# Patient Record
(demographics unavailable — no encounter records)

---

## 2024-10-10 NOTE — PHYSICAL EXAM
[Chaperone Present] : A chaperone was present in the examining room during all aspects of the physical examination [[id number]] : A chaperone was present during the pelvic exam. [FreeTextEntry2] :  MADELINE Alonzo  [Appropriately responsive] : appropriately responsive [Alert] : alert [No Acute Distress] : no acute distress [No Lymphadenopathy] : no lymphadenopathy [Soft] : soft [Non-tender] : non-tender [Non-distended] : non-distended [No HSM] : No HSM [No Lesions] : no lesions [No Mass] : no mass [Oriented x3] : oriented x3 [Labia Majora] : normal [Labia Minora] : normal [Atrophy] : atrophy [Dry Mucosa] : dry mucosa [Normal] : normal [Tenderness] : nontender [Enlarged ___ wks] : not enlarged [Uterine Adnexae] : normal [FreeTextEntry1] : left lower vulva notable for slightly raised dry lesion with excoriations and small vulvar fissures noted

## 2024-10-10 NOTE — HISTORY OF PRESENT ILLNESS
[Patient reported mammogram was normal] : Patient reported mammogram was normal [Patient reported breast sonogram was normal] : Patient reported breast sonogram was normal [Patient reported PAP Smear was normal] : Patient reported PAP Smear was normal [Patient reported colonoscopy was normal] : Patient reported colonoscopy was normal [postmenopausal] : postmenopausal [N] : Patient is not sexually active [Y] : Positive pregnancy history [Menarche Age: ____] : age at menarche was [unfilled] [No] : Patient does not have concerns regarding sex [Previously active] : previously active [Men] : men [Mammogramdate] : 2019 [BreastSonogramDate] : 2019 [PapSmeardate] : 2019 [ColonoscopyDate] : 2002 [LMPDate] : AGE 53 [PGHxTotal] : 2 [[facility] Center[facility]ul[facility]] : 1 [[facility]bra[facility] Campusiving] : 1 [PGHxABSpont] : 1 [FreeTextEntry1] : @ age 53

## 2024-10-10 NOTE — REASON FOR VISIT
[Consultation] : consultation for [FreeTextEntry2] : bladder symptoms [FreeTextEntry1] : Jacques Christian

## 2024-10-10 NOTE — DISCUSSION/SUMMARY
[FreeTextEntry1] : 57 yo with intermittent bladder pain, currently improved on nexium.  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 Will reasess after 6 weeks -RTO 6 wks for follow-up results/exam. For annual exam at that time.  -All questions answered to her satisfaction  I spent 65 minutes of total time on the day of the encounter preparing for the visit, review of records, interacting with the patient, EHR documentation, and coordinating care.

## 2024-10-10 NOTE — CONSULT LETTER
[Dear  ___] : Dear  [unfilled], [FreeTextEntry1] : I had the pleasure of evaluating your patient, EM LAU. Please see my note enclosed for full details.   Thank you for allowing me to participate in the care of this patient. If you have any questions, please do not hesitate to contact me.   Sincerely,   Monica Cortes MD, FACOG, [facility]  [address] [place] [address] Phone: [phone]  Fax: [phone]